# Patient Record
Sex: FEMALE | Race: ASIAN | Employment: UNEMPLOYED | URBAN - METROPOLITAN AREA
[De-identification: names, ages, dates, MRNs, and addresses within clinical notes are randomized per-mention and may not be internally consistent; named-entity substitution may affect disease eponyms.]

---

## 2017-05-08 ENCOUNTER — HOSPITAL ENCOUNTER (OUTPATIENT)
Age: 32
Discharge: HOME | End: 2017-05-08
Payer: COMMERCIAL

## 2017-05-08 VITALS
WEIGHT: 168 LBS | HEIGHT: 63 IN | SYSTOLIC BLOOD PRESSURE: 117 MMHG | BODY MASS INDEX: 29.77 KG/M2 | DIASTOLIC BLOOD PRESSURE: 71 MMHG | OXYGEN SATURATION: 100 % | TEMPERATURE: 99 F | HEART RATE: 73 BPM | RESPIRATION RATE: 16 BRPM

## 2017-05-08 DIAGNOSIS — N92.1 METRORRHAGIA: Primary | ICD-10-CM

## 2017-05-08 PROCEDURE — 81025 URINE PREGNANCY TEST: CPT

## 2017-05-08 PROCEDURE — 99204 OFFICE O/P NEW MOD 45 MIN: CPT

## 2017-05-08 PROCEDURE — 99214 OFFICE O/P EST MOD 30 MIN: CPT

## 2017-05-08 PROCEDURE — 81002 URINALYSIS NONAUTO W/O SCOPE: CPT

## 2017-05-08 NOTE — ED PROVIDER NOTES
Patient Seen in: [facility]    History   Patient presents with:  Urinary Symptoms (urologic)    Stated Complaint: Blood in Urine    HPI    Patient is a 82-year-old  female with a history of PCOS who presents for eval Negative. Psychiatric/Behavioral: Negative. All other systems reviewed and are negative. Positive for stated complaint: Blood in Urine  Other systems are as noted in HPI. Constitutional and vital signs reviewed.       All other systems reviewed and oriented to person, place, and time. She has normal reflexes. Skin: Skin is warm and dry. Psychiatric: She has a normal mood and affect. Her behavior is normal. Judgment and thought content normal.   Nursing note and vitals reviewed.           ED Co

## 2017-05-08 NOTE — ED INITIAL ASSESSMENT (HPI)
Hx of PCOS. Friday started having vaginal bleeding with RLQ pain. C/o \"spotting\" right now. No fever. Denies back pain. Worse with urination. Denies dysuria.

## 2017-05-12 ENCOUNTER — TELEPHONE (OUTPATIENT)
Dept: INTERNAL MEDICINE CLINIC | Facility: CLINIC | Age: 32
End: 2017-05-12

## 2017-05-12 DIAGNOSIS — R93.5 ABNORMAL US (ULTRASOUND) OF ABDOMEN: ICD-10-CM

## 2017-05-12 DIAGNOSIS — R10.30 LOWER ABDOMINAL PAIN: Primary | ICD-10-CM

## 2017-05-12 DIAGNOSIS — R10.13 COLICKY EPIGASTRIC PAIN: ICD-10-CM

## 2017-05-12 NOTE — TELEPHONE ENCOUNTER
Pt states has fast heart rate, questioning if BP  Requesting appt with Dr Syed Matos for px    Call transferred [facility]

## 2017-05-12 NOTE — TELEPHONE ENCOUNTER
Actions Requested:    Appointment made for tomorrow at 10:40 am  Situation/Background   Problem:   Fast Heart beat   Onset:   After eats \"heavy meals\" for 2 months   Associated Symptoms:    Patient stated after eating heavy meals she feels her chest is b

## 2017-05-13 ENCOUNTER — HOSPITAL ENCOUNTER (OUTPATIENT)
Dept: GENERAL RADIOLOGY | Age: 32
Discharge: HOME | End: 2017-05-13
Attending: INTERNAL MEDICINE
Payer: COMMERCIAL

## 2017-05-13 ENCOUNTER — OFFICE VISIT (OUTPATIENT)
Dept: INTERNAL MEDICINE CLINIC | Facility: CLINIC | Age: 32
End: 2017-05-13

## 2017-05-13 ENCOUNTER — LAB ENCOUNTER (OUTPATIENT)
Dept: LAB | Age: 32
End: 2017-05-13
Attending: INTERNAL MEDICINE
Payer: COMMERCIAL

## 2017-05-13 ENCOUNTER — APPOINTMENT (OUTPATIENT)
Dept: LAB | Age: 32
End: 2017-05-13
Attending: INTERNAL MEDICINE
Payer: COMMERCIAL

## 2017-05-13 VITALS
RESPIRATION RATE: 20 BRPM | SYSTOLIC BLOOD PRESSURE: 112 MMHG | DIASTOLIC BLOOD PRESSURE: 72 MMHG | BODY MASS INDEX: 31 KG/M2 | WEIGHT: 176 LBS | HEART RATE: 80 BPM

## 2017-05-13 DIAGNOSIS — R07.9 CHEST PAIN, UNSPECIFIED TYPE: ICD-10-CM

## 2017-05-13 DIAGNOSIS — R07.9 CHEST PAIN, UNSPECIFIED TYPE: Primary | ICD-10-CM

## 2017-05-13 DIAGNOSIS — R10.13 EPIGASTRIC PAIN: ICD-10-CM

## 2017-05-13 DIAGNOSIS — K21.00 GERD WITH ESOPHAGITIS: ICD-10-CM

## 2017-05-13 PROCEDURE — 99212 OFFICE O/P EST SF 10 MIN: CPT | Performed by: INTERNAL MEDICINE

## 2017-05-13 PROCEDURE — 84443 ASSAY THYROID STIM HORMONE: CPT

## 2017-05-13 PROCEDURE — 99214 OFFICE O/P EST MOD 30 MIN: CPT | Performed by: INTERNAL MEDICINE

## 2017-05-13 PROCEDURE — 85025 COMPLETE CBC W/AUTO DIFF WBC: CPT

## 2017-05-13 PROCEDURE — 36415 COLL VENOUS BLD VENIPUNCTURE: CPT

## 2017-05-13 PROCEDURE — 80053 COMPREHEN METABOLIC PANEL: CPT

## 2017-05-13 PROCEDURE — 71020 XR CHEST PA + LAT CHEST (CPT=71020): CPT | Performed by: INTERNAL MEDICINE

## 2017-05-13 PROCEDURE — 80061 LIPID PANEL: CPT

## 2017-05-13 PROCEDURE — 93005 ELECTROCARDIOGRAM TRACING: CPT

## 2017-05-13 PROCEDURE — 93010 ELECTROCARDIOGRAM REPORT: CPT | Performed by: INTERNAL MEDICINE

## 2017-05-13 RX ORDER — OMEPRAZOLE 20 MG/1
20 CAPSULE, DELAYED RELEASE ORAL
Qty: 90 CAPSULE | Refills: 0 | Status: SHIPPED | OUTPATIENT
Start: 2017-05-13 | End: 2019-04-24

## 2017-05-14 NOTE — PROGRESS NOTES
HPI:    Patient ID: Lalito Bear is a 29year old female presents for evaluation of abdominal discomfort, anxiety, menstrual irregularities.     HPI  Patient reports that last 2 months she is bothered by episodes of sudden dyspnea and chest discomfort that hap Constitutional: She is oriented to person, place, and time. She appears well-developed and well-nourished. No distress. Eyes: EOM are normal. Pupils are equal, round, and reactive to light. No scleral icterus. Neck: Normal range of motion.  Neck suppl

## 2017-05-15 ENCOUNTER — OFFICE VISIT (OUTPATIENT)
Dept: OBGYN CLINIC | Facility: CLINIC | Age: 32
End: 2017-05-15

## 2017-05-15 VITALS
DIASTOLIC BLOOD PRESSURE: 81 MMHG | HEIGHT: 63.75 IN | BODY MASS INDEX: 31.01 KG/M2 | WEIGHT: 179.38 LBS | SYSTOLIC BLOOD PRESSURE: 129 MMHG | HEART RATE: 69 BPM

## 2017-05-15 DIAGNOSIS — Z01.411 ENCOUNTER FOR GYNECOLOGICAL EXAMINATION WITH ABNORMAL FINDING: Primary | ICD-10-CM

## 2017-05-15 DIAGNOSIS — N92.6 IRREGULAR MENSES: ICD-10-CM

## 2017-05-15 DIAGNOSIS — R31.9 BLOOD IN URINE: ICD-10-CM

## 2017-05-15 PROCEDURE — 99385 PREV VISIT NEW AGE 18-39: CPT | Performed by: ADVANCED PRACTICE MIDWIFE

## 2017-05-15 PROCEDURE — 81002 URINALYSIS NONAUTO W/O SCOPE: CPT | Performed by: ADVANCED PRACTICE MIDWIFE

## 2017-05-15 PROCEDURE — 81025 URINE PREGNANCY TEST: CPT | Performed by: ADVANCED PRACTICE MIDWIFE

## 2017-05-16 NOTE — PROGRESS NOTES
HPI:   Savage Amezcua is a 29year old female who presents for a gyne annual physical exam. Has irregular bleeding this month; was on OCP until June of last year; to regulate menses; menses was regular until the past 2 months.  Hx of PCOS    Wt Readings from [facility] periods irregular   MUSCULOSKELETAL: denies back pain  PSYCHE: denies depression or anxiety, denies exposure to violence or abuse.   ENDOCRINE: denies cold intolerance, weight gain, hair loss, palpitations or racing heart rate    EXAM:   /81 mmHg  Pul PAP Smear  - Hpv Dna  High Risk , Thin Prep Collect  - Chlamydia/GC PCR Combo  - Vaginal Culture  - THINPREP PAP SMEAR ONLY;  Future  - THINPREP PAP SMEAR ONLY    3. Blood in urine  No signs of bladder infection, uterine bleeding  - POC Urinalysis, Manual D

## 2017-05-17 ENCOUNTER — TELEPHONE (OUTPATIENT)
Dept: INTERNAL MEDICINE CLINIC | Facility: CLINIC | Age: 32
End: 2017-05-17

## 2017-05-17 ENCOUNTER — TELEPHONE (OUTPATIENT)
Dept: OBGYN CLINIC | Facility: CLINIC | Age: 32
End: 2017-05-17

## 2017-05-17 DIAGNOSIS — R10.12 LEFT UPPER QUADRANT PAIN: Primary | ICD-10-CM

## 2017-05-17 NOTE — TELEPHONE ENCOUNTER
Informed pt that vaginal cultures and pap normal. Informed that HPV was negative. Advised that her next pap is due in 5 yrs. Advised to continue with yearly annual exams. Pt verbalized understanding and agrees with plan.

## 2017-05-17 NOTE — TELEPHONE ENCOUNTER
----- Message from Michelle Lawler CNM sent at 5/17/2017  1:00 PM CDT -----  Normal vaginal cultures. Pap normal and HPV negative. Please call to inform. Next pap is due in 5 years. Please continue with annual exams.

## 2017-05-17 NOTE — TELEPHONE ENCOUNTER
Chelsea Melgoza from [facility] Ultrasound Department called in regarding patient's order. States that the diagnosis for Kidney Bladder Ultrasound is not correct.  States that if it would be only for the abdomen limited (pancreas,liver, and gal bladder) states she doesn't se

## 2017-05-18 ENCOUNTER — HOSPITAL ENCOUNTER (OUTPATIENT)
Dept: ULTRASOUND IMAGING | Age: 32
Discharge: HOME | End: 2017-05-18
Attending: INTERNAL MEDICINE
Payer: COMMERCIAL

## 2017-05-18 ENCOUNTER — APPOINTMENT (OUTPATIENT)
Dept: ULTRASOUND IMAGING | Age: 32
End: 2017-05-18
Attending: INTERNAL MEDICINE
Payer: COMMERCIAL

## 2017-05-18 DIAGNOSIS — R10.13 EPIGASTRIC PAIN: ICD-10-CM

## 2017-05-18 PROCEDURE — 76770 US EXAM ABDO BACK WALL COMP: CPT | Performed by: INTERNAL MEDICINE

## 2017-05-18 PROCEDURE — 76705 ECHO EXAM OF ABDOMEN: CPT | Performed by: INTERNAL MEDICINE

## 2017-05-25 ENCOUNTER — HOSPITAL ENCOUNTER (OUTPATIENT)
Dept: NUCLEAR MEDICINE | Facility: HOSPITAL | Age: 32
Discharge: HOME | End: 2017-05-25
Attending: INTERNAL MEDICINE
Payer: COMMERCIAL

## 2017-05-25 DIAGNOSIS — R10.13 COLICKY EPIGASTRIC PAIN: ICD-10-CM

## 2017-05-25 PROCEDURE — 78227 HEPATOBIL SYST IMAGE W/DRUG: CPT | Performed by: INTERNAL MEDICINE

## 2017-05-31 ENCOUNTER — TELEPHONE (OUTPATIENT)
Dept: INTERNAL MEDICINE CLINIC | Facility: CLINIC | Age: 32
End: 2017-05-31

## 2017-05-31 DIAGNOSIS — R10.10 PAIN OF UPPER ABDOMEN: Primary | ICD-10-CM

## 2017-05-31 NOTE — TELEPHONE ENCOUNTER
Pt stts she spoke to the nurse regarding scan result but pt is now asking to speak with Dr. Wagner Hudson directly. Pt stts she is a little confused about the results. Please advise.          Notes Recorded by Ana María Max RN on 5/31/2017 at 8:40 AM  Jayme Tsai

## 2017-06-09 NOTE — TELEPHONE ENCOUNTER
Spoke to pt, states that she is a little confused about her tests, she was told that perhaps she had a \"lazy gallbladder\" but was then told her results are normal.  Advised that tests were normal and GI referral was advised if still having symptoms.   Pt

## 2017-06-10 NOTE — TELEPHONE ENCOUNTER
Spoke to patient and reviewed recent test results, negative biliary scan, minimally questionably abnormal ultrasound of the gallbladder, she reports that symptoms improved, I advised if symptoms recur she should see gastroenterologist for further evaluatio

## 2017-06-26 ENCOUNTER — TELEPHONE (OUTPATIENT)
Dept: OBGYN CLINIC | Facility: CLINIC | Age: 32
End: 2017-06-26

## 2017-06-26 ENCOUNTER — TELEPHONE (OUTPATIENT)
Dept: FAMILY MEDICINE CLINIC | Facility: CLINIC | Age: 32
End: 2017-06-26

## 2017-06-26 NOTE — TELEPHONE ENCOUNTER
Pt is calling requesting to speak with Dr ESPINOSA [facility] state that she have some question she would like to ask no further info was given

## 2017-06-26 NOTE — TELEPHONE ENCOUNTER
New pregnant pt has seen midwives in past. Asking if able to get Meningitis injection. Pt informed of rotating practice (4 female, 2 male) and we can send msg to provider if wants to start care with us.  States was under the impression will only see [facility] for

## 2017-07-03 ENCOUNTER — TELEPHONE (OUTPATIENT)
Dept: OBGYN CLINIC | Facility: CLINIC | Age: 32
End: 2017-07-03

## 2017-07-03 NOTE — TELEPHONE ENCOUNTER
Pt states she is at [facility] inside [facility]. Pt is asking if midwives ok with her getting a meningitis shot now. States her LMP was 03/05/17 and had positive pregnancy test last week. Denies vaginal bleeding or discharge.  Denies abd pain or

## 2017-07-03 NOTE — TELEPHONE ENCOUNTER
Pt tested positive for a pregnancy test last week. LMP 3/5/17. Is going out of the country on the end of August, needs to get meningitis shot, has questions as to whether or not it'll be okay.

## 2017-07-05 ENCOUNTER — OFFICE VISIT (OUTPATIENT)
Dept: OBGYN CLINIC | Facility: CLINIC | Age: 32
End: 2017-07-05

## 2017-07-05 ENCOUNTER — OFFICE VISIT (OUTPATIENT)
Dept: FAMILY MEDICINE CLINIC | Facility: CLINIC | Age: 32
End: 2017-07-05

## 2017-07-05 ENCOUNTER — TELEPHONE (OUTPATIENT)
Dept: OBGYN CLINIC | Facility: CLINIC | Age: 32
End: 2017-07-05

## 2017-07-05 DIAGNOSIS — N92.6 MISSED MENSES: Primary | ICD-10-CM

## 2017-07-05 DIAGNOSIS — Z23 NEED FOR VACCINATION: Primary | ICD-10-CM

## 2017-07-05 DIAGNOSIS — Z23 NEED FOR MENINGOCOCCUS VACCINE: ICD-10-CM

## 2017-07-05 PROCEDURE — 90471 IMMUNIZATION ADMIN: CPT | Performed by: NURSE PRACTITIONER

## 2017-07-05 PROCEDURE — 81025 URINE PREGNANCY TEST: CPT | Performed by: ADVANCED PRACTICE MIDWIFE

## 2017-07-05 PROCEDURE — 90734 MENACWYD/MENACWYCRM VACC IM: CPT | Performed by: NURSE PRACTITIONER

## 2017-07-05 PROCEDURE — 99213 OFFICE O/P EST LOW 20 MIN: CPT | Performed by: ADVANCED PRACTICE MIDWIFE

## 2017-07-05 NOTE — PROGRESS NOTES
HPI:   Ida Garibay is a 29year old female who presents for a missed menses visit. Happy about pregnancy although unplanned. Needs to get meningococcal vaccine for VISA application. Planning pilgrimage in August. Lmp irregular - spotting for most May.      Wt back pain  PSYCHE: denies depression or anxiety. EXAM:   /69   Pulse 66   Ht 5' 4\" (1.626 m)   Wt 179 lb 4 oz (81.3 kg)   LMP 05/05/2017 (Exact Date)   Breastfeeding?  No   BMI 30.77 kg/m²   GENERAL: well developed, well nourished,in no apparent d

## 2017-07-05 NOTE — PROGRESS NOTES
Patient 9 weeks gestation here to receive meningitis with written prescription from her midwife. Patient reports she does not have her vaccination record.  I Clarified and verified with Anthony Antonio in [place] office whom verified with [address] [place] [address] whom ord

## 2017-07-05 NOTE — TELEPHONE ENCOUNTER
Pt presented to clinic for meningitis vaccine. Clinic req ok from [facility]. Spoke w/ MBW who saw pt today. Advised pt to wait untl after 12 weeks but since pt needs to get visa & travel, pt can have.

## 2017-07-06 LAB
CONTROL LINE PRESENT WITH A CLEAR BACKGROUND (YES/NO): YES YES/NO
KIT LOT #: 0 NUMERIC
PREGNANCY TEST, URINE: POSITIVE

## 2017-07-07 ENCOUNTER — HOSPITAL ENCOUNTER (OUTPATIENT)
Dept: ULTRASOUND IMAGING | Age: 32
Discharge: HOME | End: 2017-07-07
Attending: ADVANCED PRACTICE MIDWIFE
Payer: COMMERCIAL

## 2017-07-07 DIAGNOSIS — N92.6 MISSED MENSES: ICD-10-CM

## 2017-07-07 PROCEDURE — 76801 OB US < 14 WKS SINGLE FETUS: CPT | Performed by: ADVANCED PRACTICE MIDWIFE

## 2017-07-07 PROCEDURE — 76817 TRANSVAGINAL US OBSTETRIC: CPT | Performed by: ADVANCED PRACTICE MIDWIFE

## 2017-07-11 ENCOUNTER — TELEPHONE (OUTPATIENT)
Dept: OBGYN CLINIC | Facility: CLINIC | Age: 32
End: 2017-07-11

## 2017-07-11 VITALS
BODY MASS INDEX: 30.6 KG/M2 | HEIGHT: 64 IN | HEART RATE: 66 BPM | WEIGHT: 179.25 LBS | DIASTOLIC BLOOD PRESSURE: 69 MMHG | SYSTOLIC BLOOD PRESSURE: 105 MMHG

## 2017-07-11 NOTE — TELEPHONE ENCOUNTER
----- Message from Nella De Los Santos CNM sent at 7/10/2017  6:56 PM CDT -----  Please call patient with normal ultrasound result and change in UZMA to 2/27/18.  Thanks

## 2017-07-12 NOTE — TELEPHONE ENCOUNTER
Informed pt that U/S results were normal. Informed that UZMA changed to 02/27/18. Pt verbalized understanding and agrees with plan.

## 2017-07-20 ENCOUNTER — HOSPITAL ENCOUNTER (EMERGENCY)
Facility: HOSPITAL | Age: 32
Discharge: HOME | End: 2017-07-20
Payer: COMMERCIAL

## 2017-07-20 VITALS
BODY MASS INDEX: 29.88 KG/M2 | SYSTOLIC BLOOD PRESSURE: 110 MMHG | TEMPERATURE: 98 F | RESPIRATION RATE: 18 BRPM | HEART RATE: 73 BPM | OXYGEN SATURATION: 99 % | HEIGHT: 64 IN | DIASTOLIC BLOOD PRESSURE: 65 MMHG | WEIGHT: 175 LBS

## 2017-07-20 DIAGNOSIS — O21.9 NAUSEA AND VOMITING IN PREGNANCY: Primary | ICD-10-CM

## 2017-07-20 LAB
ANION GAP SERPL CALC-SCNC: 10 MMOL/L (ref 0–18)
B-HCG UR QL: POSITIVE
BASOPHILS # BLD: 0 K/UL (ref 0–0.2)
BASOPHILS NFR BLD: 0 %
BILIRUB UR QL: NEGATIVE
BUN SERPL-MCNC: 11 MG/DL (ref 8–20)
BUN/CREAT SERPL: 16.2 (ref 10–20)
CALCIUM SERPL-MCNC: 9.1 MG/DL (ref 8.5–10.5)
CHLORIDE SERPL-SCNC: 104 MMOL/L (ref 95–110)
CO2 SERPL-SCNC: 23 MMOL/L (ref 22–32)
COLOR UR: YELLOW
CREAT SERPL-MCNC: 0.68 MG/DL (ref 0.5–1.5)
EOSINOPHIL # BLD: 0 K/UL (ref 0–0.7)
EOSINOPHIL NFR BLD: 0 %
ERYTHROCYTE [DISTWIDTH] IN BLOOD BY AUTOMATED COUNT: 14.5 % (ref 11–15)
GLUCOSE SERPL-MCNC: 88 MG/DL (ref 70–99)
GLUCOSE UR-MCNC: NEGATIVE MG/DL
HCT VFR BLD AUTO: 40.3 % (ref 35–48)
HGB BLD-MCNC: 13.4 G/DL (ref 12–16)
HGB UR QL STRIP.AUTO: NEGATIVE
KETONES UR-MCNC: 80 MG/DL
LYMPHOCYTES # BLD: 1.6 K/UL (ref 1–4)
LYMPHOCYTES NFR BLD: 20 %
MCH RBC QN AUTO: 29.9 PG (ref 27–32)
MCHC RBC AUTO-ENTMCNC: 33.2 G/DL (ref 32–37)
MCV RBC AUTO: 89.9 FL (ref 80–100)
MONOCYTES # BLD: 0.7 K/UL (ref 0–1)
MONOCYTES NFR BLD: 9 %
NEUTROPHILS # BLD AUTO: 5.6 K/UL (ref 1.8–7.7)
NEUTROPHILS NFR BLD: 70 %
NITRITE UR QL STRIP.AUTO: NEGATIVE
OSMOLALITY UR CALC.SUM OF ELEC: 283 MOSM/KG (ref 275–295)
PH UR: 6 [PH] (ref 5–8)
PLATELET # BLD AUTO: 201 K/UL (ref 140–400)
PMV BLD AUTO: 9.1 FL (ref 7.4–10.3)
POTASSIUM SERPL-SCNC: 3.9 MMOL/L (ref 3.3–5.1)
PROT UR-MCNC: 30 MG/DL
RBC # BLD AUTO: 4.48 M/UL (ref 3.7–5.4)
RBC #/AREA URNS AUTO: 2 /HPF
SODIUM SERPL-SCNC: 137 MMOL/L (ref 136–144)
SP GR UR STRIP: 1.03 (ref 1–1.03)
UROBILINOGEN UR STRIP-ACNC: <2
VIT C UR-MCNC: 20 MG/DL
WBC # BLD AUTO: 8 K/UL (ref 4–11)
WBC #/AREA URNS AUTO: 4 /HPF

## 2017-07-20 PROCEDURE — 81025 URINE PREGNANCY TEST: CPT

## 2017-07-20 PROCEDURE — 85025 COMPLETE CBC W/AUTO DIFF WBC: CPT

## 2017-07-20 PROCEDURE — 96374 THER/PROPH/DIAG INJ IV PUSH: CPT

## 2017-07-20 PROCEDURE — 99285 EMERGENCY DEPT VISIT HI MDM: CPT

## 2017-07-20 PROCEDURE — 96361 HYDRATE IV INFUSION ADD-ON: CPT

## 2017-07-20 PROCEDURE — 80048 BASIC METABOLIC PNL TOTAL CA: CPT

## 2017-07-20 PROCEDURE — 81001 URINALYSIS AUTO W/SCOPE: CPT | Performed by: NURSE PRACTITIONER

## 2017-07-20 RX ORDER — METOCLOPRAMIDE HYDROCHLORIDE 5 MG/ML
10 INJECTION INTRAMUSCULAR; INTRAVENOUS ONCE
Status: COMPLETED | OUTPATIENT
Start: 2017-07-20 | End: 2017-07-20

## 2017-07-20 RX ORDER — DOXYLAMINE SUCCINATE AND PYRIDOXINE HYDROCHLORIDE, DELAYED RELEASE TABLETS 10 MG/10 MG 10; 10 MG/1; MG/1
1 TABLET, DELAYED RELEASE ORAL AS NEEDED
Qty: 30 TABLET | Refills: 0 | Status: SHIPPED | OUTPATIENT
Start: 2017-07-20 | End: 2018-08-09

## 2017-07-20 NOTE — ED PROVIDER NOTES
Patient Seen in: [facility] AND [facility] Emergency Department    History   CC: vomiting in pregnancy  HPI: Sophia Ruffin 29year old female  who presents to the ER c/o nausea and vomiting that has been present for the past 2 days.   States she is currently 8.5 week morning, 1 tablet mid-afternoon and 2 tablets at HS on day 4. Maximum dose 4 tablets per day).    omeprazole 20 MG Oral Capsule Delayed Release,  Take 1 capsule (20 mg total) by mouth every morning before breakfast.           Constitutional and vital signs DIFFERENTIAL WITH PLATELET.   Procedure                               Abnormality         Status                     ---------                               -----------         ------                     CBC W/ DIFFERENTIAL[[id number]]          Normal

## 2017-07-20 NOTE — ED NOTES
Vomiting since yesterday.  Abo1. Pt had hyperemesis with previous pregnancy. Blood drawn with IV start. Fluid bolus infusing.

## 2017-08-09 PROCEDURE — 85025 COMPLETE CBC W/AUTO DIFF WBC: CPT | Performed by: OBSTETRICS & GYNECOLOGY

## 2017-08-09 PROCEDURE — 86780 TREPONEMA PALLIDUM: CPT | Performed by: OBSTETRICS & GYNECOLOGY

## 2017-08-09 PROCEDURE — 87591 N.GONORRHOEAE DNA AMP PROB: CPT | Performed by: OBSTETRICS & GYNECOLOGY

## 2017-08-09 PROCEDURE — 86901 BLOOD TYPING SEROLOGIC RH(D): CPT | Performed by: OBSTETRICS & GYNECOLOGY

## 2017-08-09 PROCEDURE — 87491 CHLMYD TRACH DNA AMP PROBE: CPT | Performed by: OBSTETRICS & GYNECOLOGY

## 2017-08-09 PROCEDURE — 87340 HEPATITIS B SURFACE AG IA: CPT | Performed by: OBSTETRICS & GYNECOLOGY

## 2017-08-09 PROCEDURE — 86762 RUBELLA ANTIBODY: CPT | Performed by: OBSTETRICS & GYNECOLOGY

## 2017-08-09 PROCEDURE — 87086 URINE CULTURE/COLONY COUNT: CPT | Performed by: OBSTETRICS & GYNECOLOGY

## 2017-08-09 PROCEDURE — 86900 BLOOD TYPING SEROLOGIC ABO: CPT | Performed by: OBSTETRICS & GYNECOLOGY

## 2017-08-09 PROCEDURE — 36415 COLL VENOUS BLD VENIPUNCTURE: CPT | Performed by: OBSTETRICS & GYNECOLOGY

## 2017-08-09 PROCEDURE — 86850 RBC ANTIBODY SCREEN: CPT | Performed by: OBSTETRICS & GYNECOLOGY

## 2017-08-09 PROCEDURE — 87389 HIV-1 AG W/HIV-1&-2 AB AG IA: CPT | Performed by: OBSTETRICS & GYNECOLOGY

## 2017-10-16 PROBLEM — Z34.90 PREGNANT: Status: ACTIVE | Noted: 2017-10-16

## 2017-10-16 PROBLEM — O09.299 HX OF PREECLAMPSIA, PRIOR PREGNANCY, CURRENTLY PREGNANT: Status: ACTIVE | Noted: 2017-10-16

## 2017-11-08 PROCEDURE — 87086 URINE CULTURE/COLONY COUNT: CPT | Performed by: OBSTETRICS & GYNECOLOGY

## 2017-11-13 PROCEDURE — 87086 URINE CULTURE/COLONY COUNT: CPT | Performed by: OBSTETRICS & GYNECOLOGY

## 2017-12-05 PROBLEM — O99.019 ANTEPARTUM ANEMIA: Status: ACTIVE | Noted: 2017-12-05

## 2017-12-05 PROCEDURE — 82950 GLUCOSE TEST: CPT | Performed by: OBSTETRICS & GYNECOLOGY

## 2017-12-05 PROCEDURE — 87389 HIV-1 AG W/HIV-1&-2 AB AG IA: CPT | Performed by: OBSTETRICS & GYNECOLOGY

## 2018-01-25 PROBLEM — O09.299 HISTORY OF OLIGOHYDRAMNIOS IN PRIOR PREGNANCY, CURRENTLY PREGNANT: Status: ACTIVE | Noted: 2018-01-25

## 2018-01-25 PROBLEM — O16.3 ELEVATED BLOOD PRESSURE AFFECTING PREGNANCY IN THIRD TRIMESTER, ANTEPARTUM: Status: ACTIVE | Noted: 2018-01-25

## 2018-01-25 PROCEDURE — 82570 ASSAY OF URINE CREATININE: CPT | Performed by: OBSTETRICS & GYNECOLOGY

## 2018-01-25 PROCEDURE — 84156 ASSAY OF PROTEIN URINE: CPT | Performed by: OBSTETRICS & GYNECOLOGY

## 2018-01-25 PROCEDURE — 87081 CULTURE SCREEN ONLY: CPT | Performed by: OBSTETRICS & GYNECOLOGY

## 2018-01-25 PROCEDURE — 87653 STREP B DNA AMP PROBE: CPT | Performed by: OBSTETRICS & GYNECOLOGY

## 2018-02-14 PROBLEM — O13.3 PREGNANCY-INDUCED HYPERTENSION IN THIRD TRIMESTER: Status: ACTIVE | Noted: 2018-02-14

## 2018-02-16 ENCOUNTER — HOSPITAL (OUTPATIENT)
Dept: OTHER | Age: 33
End: 2018-02-16
Attending: OBSTETRICS & GYNECOLOGY

## 2018-02-16 LAB
ALBUMIN SERPL-MCNC: 2.4 GM/DL (ref 3.6–5.1)
ALBUMIN/GLOB SERPL: 0.6 {RATIO} (ref 1–2.4)
ALP SERPL-CCNC: 280 UNIT/L (ref 45–117)
ALT SERPL-CCNC: 20 UNIT/L
ANALYZER ANC (IANC): ABNORMAL
ANION GAP SERPL CALC-SCNC: 14 MMOL/L (ref 10–20)
AST SERPL-CCNC: 23 UNIT/L
BASE DEFICIT BLDCOA-SCNC: 2 MMOL/L
BASE DEFICIT BLDCOV-SCNC: 4 MMOL/L
BASE EXCESS BLDCOA CALC-SCNC: NORMAL MMOL/L
BASE EXCESS-RC: NORMAL
BILIRUB SERPL-MCNC: 0.2 MG/DL (ref 0.2–1)
BUN SERPL-MCNC: 14 MG/DL (ref 6–20)
BUN/CREAT SERPL: 22 (ref 7–25)
CALCIUM SERPL-MCNC: 8.4 MG/DL (ref 8.4–10.2)
CHLORIDE: 105 MMOL/L (ref 98–107)
CO2 SERPL-SCNC: 20 MMOL/L (ref 21–32)
CREAT SERPL-MCNC: 0.65 MG/DL (ref 0.51–0.95)
ERYTHROCYTE [DISTWIDTH] IN BLOOD: 14.6 % (ref 11–15)
GLOBULIN SER-MCNC: 3.9 GM/DL (ref 2–4)
GLUCOSE SERPL-MCNC: 132 MG/DL (ref 65–99)
HCO3 BLDCOA-SCNC: 27 MMOL/L (ref 21–28)
HCO3 BLDCOV-SCNC: 22 MMOL/L (ref 22–29)
HEMATOCRIT: 34.6 % (ref 36–46.5)
HGB BLD-MCNC: 11.8 GM/DL (ref 12–15.5)
HIV1 AB SERPL QL IA: NONREACTIVE
MCH RBC QN AUTO: 29.5 PG (ref 26–34)
MCHC RBC AUTO-ENTMCNC: 34.1 GM/DL (ref 32–36.5)
MCV RBC AUTO: 86.5 FL (ref 78–100)
ORGANIC ACIDS/CREAT UR-SRTO: 94.05 MG/DL
PCO2 BLDCOA: 67 MM HG (ref 31–74)
PCO2 BLDCOV: 42 MM HG (ref 23–49)
PH BLDCOA: 7.22 UNIT (ref 7.18–7.38)
PH BLDCOV: 7.33 UNIT (ref 7.25–7.45)
PLATELET # BLD: 224 THOUSAND/MCL (ref 140–450)
PO2 BLDCOV: 30 MM HG (ref 17–41)
PO2 RC ARTERIAL CORD (RACPO): <20 MM HG (ref 6–31)
POTASSIUM SERPL-SCNC: 3.8 MMOL/L (ref 3.4–5.1)
PROT SERPL-MCNC: 6.3 GM/DL (ref 6.4–8.2)
PROT UR-MCNC: 11 MG/DL
PROT/CREAT UR: 117 MG/GM CREAT
RBC # BLD: 4 MILLION/MCL (ref 4–5.2)
SODIUM SERPL-SCNC: 135 MMOL/L (ref 135–145)
URATE SERPL-MCNC: 5.4 MG/DL (ref 2.6–5.9)
WBC # BLD: 8.6 THOUSAND/MCL (ref 4.2–11)

## 2018-02-17 LAB
ANALYZER ANC (IANC): ABNORMAL
ERYTHROCYTE [DISTWIDTH] IN BLOOD: 15.1 % (ref 11–15)
HEMATOCRIT: 33.7 % (ref 36–46.5)
HGB BLD-MCNC: 11.5 GM/DL (ref 12–15.5)
MCH RBC QN AUTO: 29.9 PG (ref 26–34)
MCHC RBC AUTO-ENTMCNC: 34.1 GM/DL (ref 32–36.5)
MCV RBC AUTO: 87.5 FL (ref 78–100)
PLATELET # BLD: 205 THOUSAND/MCL (ref 140–450)
RBC # BLD: 3.85 MILLION/MCL (ref 4–5.2)
WBC # BLD: 10 THOUSAND/MCL (ref 4.2–11)

## 2018-03-26 PROBLEM — O13.3 PREGNANCY-INDUCED HYPERTENSION IN THIRD TRIMESTER: Status: RESOLVED | Noted: 2018-02-14 | Resolved: 2018-03-26

## 2018-03-26 PROBLEM — Z87.59 HISTORY OF GESTATIONAL HYPERTENSION: Status: ACTIVE | Noted: 2018-03-26

## 2018-03-26 PROBLEM — O09.299 HX OF PREECLAMPSIA, PRIOR PREGNANCY, CURRENTLY PREGNANT: Status: RESOLVED | Noted: 2017-10-16 | Resolved: 2018-03-26

## 2018-03-26 PROBLEM — Z34.90 PREGNANT: Status: RESOLVED | Noted: 2017-10-16 | Resolved: 2018-03-26

## 2018-03-26 PROBLEM — O16.3 ELEVATED BLOOD PRESSURE AFFECTING PREGNANCY IN THIRD TRIMESTER, ANTEPARTUM: Status: RESOLVED | Noted: 2018-01-25 | Resolved: 2018-03-26

## 2018-03-26 PROBLEM — O99.019 ANTEPARTUM ANEMIA: Status: RESOLVED | Noted: 2017-12-05 | Resolved: 2018-03-26

## 2018-03-26 PROBLEM — O09.299 HISTORY OF OLIGOHYDRAMNIOS IN PRIOR PREGNANCY, CURRENTLY PREGNANT: Status: RESOLVED | Noted: 2018-01-25 | Resolved: 2018-03-26

## 2019-04-11 PROCEDURE — 87086 URINE CULTURE/COLONY COUNT: CPT | Performed by: OBSTETRICS & GYNECOLOGY

## 2019-06-12 PROCEDURE — 87147 CULTURE TYPE IMMUNOLOGIC: CPT | Performed by: NURSE PRACTITIONER

## 2019-06-12 PROCEDURE — 87081 CULTURE SCREEN ONLY: CPT | Performed by: NURSE PRACTITIONER

## 2022-10-26 ENCOUNTER — HOSPITAL ENCOUNTER (EMERGENCY)
Age: 37
Discharge: LEFT BEFORE DISCHARGE | End: 2022-10-26

## 2022-10-26 ENCOUNTER — APPOINTMENT (OUTPATIENT)
Dept: VASCULAR LAB | Age: 37
End: 2022-10-26
Attending: EMERGENCY MEDICINE

## 2022-10-26 VITALS
SYSTOLIC BLOOD PRESSURE: 121 MMHG | BODY MASS INDEX: 38.09 KG/M2 | RESPIRATION RATE: 18 BRPM | WEIGHT: 215 LBS | DIASTOLIC BLOOD PRESSURE: 78 MMHG | OXYGEN SATURATION: 99 % | TEMPERATURE: 98.1 F | HEART RATE: 75 BPM | HEIGHT: 63 IN

## 2022-10-26 PROCEDURE — 93971 EXTREMITY STUDY: CPT

## 2022-10-26 PROCEDURE — 10003627 HB COUNTER ED NO SERVICE

## 2022-10-26 ASSESSMENT — PAIN SCALES - GENERAL: PAINLEVEL_OUTOF10: 5

## 2022-10-26 ASSESSMENT — PAIN DESCRIPTION - PAIN TYPE: TYPE: ACUTE PAIN

## (undated) NOTE — MR AVS SNAPSHOT
[facility]  [address] [place] [address], [address] [place] [address] Stephanie               Thank you for choosing us for your health care visit with Renetta Mercado MD.  We are glad to serve you and happy to provide you with this summary of yo Assoc Dx: Epigastric pain [R10.13]           US GALLBLADDER (CPT=76705)    Complete by: May 13, 2017 (Approximate)    Assoc Dx:   Epigastric pain [R10.13]                 Scheduling Instructions     Saturday May 13, 2017     Imaging:  US GALLBLADDER (CPT [address] [place] [address]    Netta Willis [address]  [address] [address], [facility]    It is the patient's responsibility to check with and follow their insurance company's guidelines for prior authorization welcome for most exams. [facility]  Diagnostics Main [facility] Parking) (Yellow Parking)  [address]   [address] [facility], [address] [place] [address] M[facility]i [facility] Medical Issues Discussed Today     Chest pain, unspecified type    -  Primary    Epigastric pain          Instructions and Information about Your Health     None      Allergies as of May 13, 2017     Bactrim [Sulfamethoxazole W/Trimethoprim] Rash    Cephal Eat plenty of low-fat dairy products High fat meats and dairy   Choose whole grain products Foods high in sodium   Water is best for hydration Fast food.    Eat at home when possible     Tips for increasing your physical activity – Adults who are physically

## (undated) NOTE — ED AVS SNAPSHOT
[facility] AND [facility] in [address] [facility]  [address]    Phone:  [phone]    Fax:  [phone] [facility]   MRN: [id number]    Department:  [facility] AND [facility] in [address] [place] [address]   Date of Visit:  5/8/201 aspect of your visit today. In an effort to constantly improve our service to you, we would appreciate any positive or negative feedback related to the care you received in our Immediate Care. Please call our [address] at [phone].   Your Immediate contact you. Please make sure we have your correct phone number on file.      OUR CURRENT HOURS OF OPERATION:  [address] [facility]  WEEKENDS AND HOLIDAYS 8AM - 6PM    I certified that I have received a copy of the aftercare instructions; that t [employer] will allow you to access patient instructions from your recent visit,  view other health information, and more. To sign up or find more information, go to [email or web address] and click on the Sign Up Now link in the [employer] box.      Enter

## (undated) NOTE — ED AVS SNAPSHOT
[facility] Emergency Department  [place] [address]   [place] [address]  Phone:  [phone]  Fax:  [place]   MRN: [id number]    Department:  [facility] Emergency Department   Date of Visit:  7/20/2017 visiting [email or web address].    IF THERE IS ANY CHANGE OR WORSENING OF YOUR CONDITION, CALL YOUR PRIMARY CARE PHYSICIAN AT ONCE OR RETURN IMMEDIATELY TO THE EMERGENCY DEPARTMENT.     If you have been prescribed any medication(s), please fill your prescription

## (undated) NOTE — MR AVS SNAPSHOT
[address]  [phone]               Thank you for choosing us for your health care visit with Jenny Holland CNM.   We are glad to serve you and happy to provide you with this sum is preferred that this exam be scheduled in the morning. [place] locations: Schedule before 9 a.m. Today's Orders     Chlamydia/GC PCR Combo    Complete by: May 15, 2017 (Approximate)    Assoc Dx:   Irregular menses [N92.6], Blood in urine [ Norethin-Eth Estrad-Fe Biphas 1 MG-10 MCG / 10 MCG Tabs   Take 1 tablet by mouth daily.    Commonly known as:  LO LOESTRIN FE           omeprazole 20 MG Cpdr   Take 1 capsule (20 mg total) by mouth every morning before breakfast.   Commonly known your Zip Code and Date of Birth to complete the sign-up process. If you do not sign up before the expiration date, you must request a new code.     Your unique [facility] Access Code: [email or web address]  Expires: 7/7/2017  9:32 AM    If you have questions, you can ca